# Patient Record
Sex: MALE | Race: WHITE | NOT HISPANIC OR LATINO | Employment: FULL TIME | URBAN - METROPOLITAN AREA
[De-identification: names, ages, dates, MRNs, and addresses within clinical notes are randomized per-mention and may not be internally consistent; named-entity substitution may affect disease eponyms.]

---

## 2017-10-17 ENCOUNTER — ALLSCRIPTS OFFICE VISIT (OUTPATIENT)
Dept: OTHER | Facility: OTHER | Age: 62
End: 2017-10-17

## 2017-10-17 LAB
BILIRUB UR QL STRIP: NORMAL
CLARITY UR: NORMAL
COLOR UR: YELLOW
GLUCOSE (HISTORICAL): NORMAL
HGB UR QL STRIP.AUTO: NORMAL
KETONES UR STRIP-MCNC: NORMAL MG/DL
LEUKOCYTE ESTERASE UR QL STRIP: NORMAL
NITRITE UR QL STRIP: NORMAL
PH UR STRIP.AUTO: 7 [PH]
PROT UR STRIP-MCNC: NORMAL MG/DL
SP GR UR STRIP.AUTO: 1.02
UROBILINOGEN UR QL STRIP.AUTO: 0.2

## 2018-01-14 VITALS
SYSTOLIC BLOOD PRESSURE: 108 MMHG | BODY MASS INDEX: 25.62 KG/M2 | HEIGHT: 69 IN | WEIGHT: 173 LBS | DIASTOLIC BLOOD PRESSURE: 72 MMHG

## 2018-10-12 RX ORDER — MULTIVIT-MIN/IRON/FOLIC ACID/K 18-600-40
CAPSULE ORAL DAILY
COMMUNITY

## 2018-10-12 RX ORDER — MULTIVIT WITH MINERALS/LUTEIN
TABLET ORAL DAILY
COMMUNITY

## 2018-10-12 RX ORDER — TRAMADOL HYDROCHLORIDE 50 MG/1
TABLET ORAL DAILY
COMMUNITY

## 2018-10-12 RX ORDER — SILDENAFIL CITRATE 20 MG/1
2-3 TABLET ORAL AS NEEDED
COMMUNITY
Start: 2017-10-17 | End: 2019-11-22 | Stop reason: SDUPTHER

## 2018-10-17 DIAGNOSIS — N40.1 ENLARGED PROSTATE WITH LOWER URINARY TRACT SYMPTOMS (LUTS): ICD-10-CM

## 2018-10-22 ENCOUNTER — OFFICE VISIT (OUTPATIENT)
Dept: UROLOGY | Facility: MEDICAL CENTER | Age: 63
End: 2018-10-22
Payer: COMMERCIAL

## 2018-10-22 VITALS
DIASTOLIC BLOOD PRESSURE: 90 MMHG | BODY MASS INDEX: 24.88 KG/M2 | HEIGHT: 69 IN | SYSTOLIC BLOOD PRESSURE: 142 MMHG | WEIGHT: 168 LBS

## 2018-10-22 DIAGNOSIS — N13.8 BPH WITH OBSTRUCTION/LOWER URINARY TRACT SYMPTOMS: Primary | ICD-10-CM

## 2018-10-22 DIAGNOSIS — N40.1 BPH WITH OBSTRUCTION/LOWER URINARY TRACT SYMPTOMS: Primary | ICD-10-CM

## 2018-10-22 DIAGNOSIS — N52.8 OTHER MALE ERECTILE DYSFUNCTION: ICD-10-CM

## 2018-10-22 LAB
SL AMB  POCT GLUCOSE, UA: NORMAL
SL AMB LEUKOCYTE ESTERASE,UA: NORMAL
SL AMB POCT BILIRUBIN,UA: NORMAL
SL AMB POCT BLOOD,UA: NORMAL
SL AMB POCT CLARITY,UA: CLEAR
SL AMB POCT COLOR,UA: YELLOW
SL AMB POCT KETONES,UA: NORMAL
SL AMB POCT NITRITE,UA: NORMAL
SL AMB POCT PH,UA: 5.5
SL AMB POCT SPECIFIC GRAVITY,UA: 1.02
SL AMB POCT URINE PROTEIN: NORMAL
SL AMB POCT UROBILINOGEN: 0.2

## 2018-10-22 PROCEDURE — 99214 OFFICE O/P EST MOD 30 MIN: CPT | Performed by: UROLOGY

## 2018-10-22 PROCEDURE — 81003 URINALYSIS AUTO W/O SCOPE: CPT | Performed by: UROLOGY

## 2018-10-22 NOTE — ASSESSMENT & PLAN NOTE
He is doing well with sildenafil 20 mg  He uses 3 pills with good effect  He is satisfied with this method of treatment will call when he needs a refill on his prescription

## 2018-10-22 NOTE — LETTER
October 22, 2018     Evans DixonDO  [address] [facility] [address]    Patient: Caroline Sanderson   YOB: 1955   Date of Visit: 10/22/2018       Dear Dr Eva Marion: Thank you for referring Tia Ponce to me for evaluation  Below are my notes for this consultation  If you have questions, please do not hesitate to call me  I look forward to following your patient along with you  Sincerely,        Climmie Lombard, MD        CC: No Recipients  Climmie Lombard, MD  10/22/2018  9:52 AM  Sign at close encounter  Assessment/Plan:    BPH with obstruction/lower urinary tract symptoms  He is voiding well with AUA symptom score of 2  Urinalysis is negative  PSA was 1 4 on October 11, 2018  We will continue to follow voiding pattern watchful waiting  Other male erectile dysfunction  He is doing well with sildenafil 20 mg  He uses 3 pills with good effect  He is satisfied with this method of treatment will call when he needs a refill on his prescription  Diagnoses and all orders for this visit:    BPH with obstruction/lower urinary tract symptoms  -     POCT urine dip auto non-scope  -     PSA Total, Diagnostic; Future    Other male erectile dysfunction          Subjective:      Patient ID: Caroline Sanderson is a 59 y o  male  Benign Prostatic Hypertrophy   This is a chronic problem  The current episode started more than 1 year ago  The problem is unchanged  Irritative symptoms do not include frequency, nocturia or urgency  Obstructive symptoms include an intermittent stream  Obstructive symptoms do not include incomplete emptying, a slower stream, straining or a weak stream  Pertinent negatives include no chills, dysuria, genital pain, hematuria, hesitancy, nausea or vomiting  AUA score is 0-7  He is sexually active  Nothing aggravates the symptoms  Past treatments include nothing  Erectile Dysfunction   This is a chronic problem   The current episode started more than 1 year ago  The nature of his difficulty is maintaining erection  Irritative symptoms do not include frequency, nocturia or urgency  Obstructive symptoms include an intermittent stream  Obstructive symptoms do not include incomplete emptying, a slower stream, straining or a weak stream  Pertinent negatives include no chills, dysuria, genital pain, hematuria or hesitancy  Nothing aggravates the symptoms  Past treatments include sildenafil  The treatment provided significant relief  He has been using treatment for 1 to 2 years  Risk factors include BPH  The following portions of the patient's history were reviewed and updated as appropriate: allergies, current medications, past family history, past medical history, past social history, past surgical history and problem list     Review of Systems   Constitutional: Negative for chills, diaphoresis, fatigue and fever  HENT: Negative  Eyes: Negative  Respiratory: Negative  Cardiovascular: Negative  Gastrointestinal: Negative for nausea and vomiting  Endocrine: Negative  Genitourinary: Negative for dysuria, frequency, hematuria, hesitancy, incomplete emptying, nocturia and urgency  Musculoskeletal: Positive for back pain  Skin: Negative  Allergic/Immunologic: Negative  Neurological: Negative  Hematological: Negative  Psychiatric/Behavioral: Negative  Objective:      /90 (BP Location: Left arm, Patient Position: Sitting)   Ht 5' 9" (1 753 m)   Wt 76 2 kg (168 lb)   BMI 24 81 kg/m²           Physical Exam   Constitutional: He is oriented to person, place, and time  He appears well-developed and well-nourished  HENT:   Head: Normocephalic and atraumatic  Eyes: Conjunctivae are normal    Neck: Neck supple  Cardiovascular: Normal rate  Pulmonary/Chest: Effort normal    Abdominal: Soft  Bowel sounds are normal  He exhibits no distension and no mass  There is no tenderness   There is no rebound, no guarding and no CVA tenderness  Genitourinary: Rectum normal, testes normal and penis normal  Right testis shows no mass  Left testis shows no mass  No phimosis or hypospadias  Genitourinary Comments: Prostate 1 1/2 X and palpably benign  Musculoskeletal: He exhibits no edema  Neurological: He is alert and oriented to person, place, and time  Skin: Skin is warm and dry  Psychiatric: He has a normal mood and affect  His behavior is normal  Judgment and thought content normal    Vitals reviewed

## 2018-10-22 NOTE — ASSESSMENT & PLAN NOTE
He is voiding well with AUA symptom score of 2  Urinalysis is negative  PSA was 1 4 on October 11, 2018  We will continue to follow voiding pattern watchful waiting

## 2018-10-22 NOTE — PROGRESS NOTES
Assessment/Plan:    BPH with obstruction/lower urinary tract symptoms  He is voiding well with AUA symptom score of 2  Urinalysis is negative  PSA was 1 4 on October 11, 2018  We will continue to follow voiding pattern watchful waiting  Other male erectile dysfunction  He is doing well with sildenafil 20 mg  He uses 3 pills with good effect  He is satisfied with this method of treatment will call when he needs a refill on his prescription  Diagnoses and all orders for this visit:    BPH with obstruction/lower urinary tract symptoms  -     POCT urine dip auto non-scope  -     PSA Total, Diagnostic; Future    Other male erectile dysfunction          Subjective:      Patient ID: Kerrie Meraz is a 59 y o  male  Benign Prostatic Hypertrophy   This is a chronic problem  The current episode started more than 1 year ago  The problem is unchanged  Irritative symptoms do not include frequency, nocturia or urgency  Obstructive symptoms include an intermittent stream  Obstructive symptoms do not include incomplete emptying, a slower stream, straining or a weak stream  Pertinent negatives include no chills, dysuria, genital pain, hematuria, hesitancy, nausea or vomiting  AUA score is 0-7  He is sexually active  Nothing aggravates the symptoms  Past treatments include nothing  Erectile Dysfunction   This is a chronic problem  The current episode started more than 1 year ago  The nature of his difficulty is maintaining erection  Irritative symptoms do not include frequency, nocturia or urgency  Obstructive symptoms include an intermittent stream  Obstructive symptoms do not include incomplete emptying, a slower stream, straining or a weak stream  Pertinent negatives include no chills, dysuria, genital pain, hematuria or hesitancy  Nothing aggravates the symptoms  Past treatments include sildenafil  The treatment provided significant relief  He has been using treatment for 1 to 2 years   Risk factors include BPH        The following portions of the patient's history were reviewed and updated as appropriate: allergies, current medications, past family history, past medical history, past social history, past surgical history and problem list     Review of Systems   Constitutional: Negative for chills, diaphoresis, fatigue and fever  HENT: Negative  Eyes: Negative  Respiratory: Negative  Cardiovascular: Negative  Gastrointestinal: Negative for nausea and vomiting  Endocrine: Negative  Genitourinary: Negative for dysuria, frequency, hematuria, hesitancy, incomplete emptying, nocturia and urgency  Musculoskeletal: Positive for back pain  Skin: Negative  Allergic/Immunologic: Negative  Neurological: Negative  Hematological: Negative  Psychiatric/Behavioral: Negative  Objective:      /90 (BP Location: Left arm, Patient Position: Sitting)   Ht 5' 9" (1 753 m)   Wt 76 2 kg (168 lb)   BMI 24 81 kg/m²          Physical Exam   Constitutional: He is oriented to person, place, and time  He appears well-developed and well-nourished  HENT:   Head: Normocephalic and atraumatic  Eyes: Conjunctivae are normal    Neck: Neck supple  Cardiovascular: Normal rate  Pulmonary/Chest: Effort normal    Abdominal: Soft  Bowel sounds are normal  He exhibits no distension and no mass  There is no tenderness  There is no rebound, no guarding and no CVA tenderness  Genitourinary: Rectum normal, testes normal and penis normal  Right testis shows no mass  Left testis shows no mass  No phimosis or hypospadias  Genitourinary Comments: Prostate 1 1/2 X and palpably benign  Musculoskeletal: He exhibits no edema  Neurological: He is alert and oriented to person, place, and time  Skin: Skin is warm and dry  Psychiatric: He has a normal mood and affect  His behavior is normal  Judgment and thought content normal    Vitals reviewed

## 2018-10-22 NOTE — PATIENT INSTRUCTIONS
Benign Prostatic Hypertrophy   WHAT YOU NEED TO KNOW:   Benign prostatic hypertrophy (BPH) is a condition that causes your prostate gland to grow larger than normal  The prostate gland is the male sex gland that produces a fluid that is part of semen  It is about the size of a walnut and it is located under the bladder  As the prostate grows, it can squeeze the urethra  This can block urine flow and cause urinary problems  DISCHARGE INSTRUCTIONS:   Medicines:   · Alpha blockers: This medicine relaxes the muscles in your prostate and bladder  It may help you urinate more easily  · 5 alpha reductase inhibitors: These medicines block the production of a hormone that causes the prostate to get larger  It may help slow the growth of the prostate or shrink the prostate  · Take your medicine as directed  Contact your healthcare provider if you think your medicine is not helping or if you have side effects  Tell him or her if you are allergic to any medicine  Keep a list of the medicines, vitamins, and herbs you take  Include the amounts, and when and why you take them  Bring the list or the pill bottles to follow-up visits  Carry your medicine list with you in case of an emergency  Follow up with your healthcare provider as directed:  Write down your questions so you remember to ask them during your visits  Manage BPH:   · Do not let your bladder get too full before you empty it  Urinate when you feel the urge  · Limit alcohol  Do not drink large amounts of any liquid at one time  · Decrease the amount of salt you eat  Examples of salty foods are chips, cured meats, and canned soups  Do not use table salt  · Healthcare providers may tell you not to eat spicy foods such as chilli peppers  This may help you find out if spicy food makes your BPH symptoms worse  · You may have sex if you feel well  Contact your healthcare provider if:   · There is a large amount of blood in your urine  · Your signs and symptoms get worse  · You have a fever  · You have questions or concerns about your condition or care  Seek care immediately if:   · You are unable to urinate  · Your bladder feels very full and painful  © 2017 [employer] Information is for End User's use only and may not be sold, redistributed or otherwise used for commercial purposes  All illustrations and images included in CareNotes® are the copyrighted property of [employer]  or [employer]  The above information is an  only  It is not intended as medical advice for individual conditions or treatments  Talk to your doctor, nurse or pharmacist before following any medical regimen to see if it is safe and effective for you

## 2019-09-30 ENCOUNTER — TELEPHONE (OUTPATIENT)
Dept: UROLOGY | Facility: AMBULATORY SURGERY CENTER | Age: 64
End: 2019-09-30

## 2019-09-30 DIAGNOSIS — N13.8 BPH WITH OBSTRUCTION/LOWER URINARY TRACT SYMPTOMS: Primary | ICD-10-CM

## 2019-09-30 DIAGNOSIS — N40.1 BPH WITH OBSTRUCTION/LOWER URINARY TRACT SYMPTOMS: Primary | ICD-10-CM

## 2019-09-30 NOTE — TELEPHONE ENCOUNTER
Patient managed by Rhonda Rogers r/s his appointment for 11/22/19  Needs updated psa lab order faxed to his office   [phone]

## 2019-10-08 ENCOUNTER — APPOINTMENT (OUTPATIENT)
Dept: URGENT CARE | Facility: CLINIC | Age: 64
End: 2019-10-08

## 2019-10-08 ENCOUNTER — TRANSCRIBE ORDERS (OUTPATIENT)
Dept: URGENT CARE | Facility: CLINIC | Age: 64
End: 2019-10-08

## 2019-10-08 DIAGNOSIS — Z23 NEED FOR INFLUENZA VACCINATION: Primary | ICD-10-CM

## 2019-10-08 DIAGNOSIS — Z23 NEEDS FLU SHOT: Primary | ICD-10-CM

## 2019-11-06 ENCOUNTER — APPOINTMENT (OUTPATIENT)
Dept: LAB | Age: 64
End: 2019-11-06

## 2019-11-06 ENCOUNTER — TRANSCRIBE ORDERS (OUTPATIENT)
Dept: URGENT CARE | Age: 64
End: 2019-11-06

## 2019-11-06 ENCOUNTER — APPOINTMENT (OUTPATIENT)
Dept: URGENT CARE | Age: 64
End: 2019-11-06

## 2019-11-06 DIAGNOSIS — Z13.9 SCREENING FOR UNSPECIFIED CONDITION: Primary | ICD-10-CM

## 2019-11-06 DIAGNOSIS — Z13.9 SCREENING FOR UNSPECIFIED CONDITION: ICD-10-CM

## 2019-11-06 LAB
CHOLEST SERPL-MCNC: 173 MG/DL (ref 50–200)
GLUCOSE P FAST SERPL-MCNC: 80 MG/DL (ref 65–99)
HDLC SERPL-MCNC: 48 MG/DL
LDLC SERPL CALC-MCNC: 111 MG/DL (ref 0–100)
NONHDLC SERPL-MCNC: 125 MG/DL
TRIGL SERPL-MCNC: 68 MG/DL

## 2019-11-06 PROCEDURE — 36415 COLL VENOUS BLD VENIPUNCTURE: CPT

## 2019-11-06 PROCEDURE — 80323 ALKALOIDS NOS: CPT

## 2019-11-06 PROCEDURE — 80061 LIPID PANEL: CPT

## 2019-11-06 PROCEDURE — 82947 ASSAY GLUCOSE BLOOD QUANT: CPT

## 2019-11-12 LAB
COTININE SERPL-MCNC: NORMAL NG/ML
NICOTINE SERPL-MCNC: NORMAL NG/ML

## 2019-11-22 ENCOUNTER — OFFICE VISIT (OUTPATIENT)
Dept: UROLOGY | Facility: MEDICAL CENTER | Age: 64
End: 2019-11-22
Payer: COMMERCIAL

## 2019-11-22 VITALS
HEIGHT: 69 IN | SYSTOLIC BLOOD PRESSURE: 142 MMHG | WEIGHT: 168 LBS | DIASTOLIC BLOOD PRESSURE: 80 MMHG | BODY MASS INDEX: 24.88 KG/M2

## 2019-11-22 DIAGNOSIS — N13.8 BPH WITH OBSTRUCTION/LOWER URINARY TRACT SYMPTOMS: Primary | ICD-10-CM

## 2019-11-22 DIAGNOSIS — N52.8 OTHER MALE ERECTILE DYSFUNCTION: ICD-10-CM

## 2019-11-22 DIAGNOSIS — N40.1 BPH WITH OBSTRUCTION/LOWER URINARY TRACT SYMPTOMS: Primary | ICD-10-CM

## 2019-11-22 LAB
SL AMB  POCT GLUCOSE, UA: NORMAL
SL AMB LEUKOCYTE ESTERASE,UA: NORMAL
SL AMB POCT BILIRUBIN,UA: NORMAL
SL AMB POCT BLOOD,UA: NORMAL
SL AMB POCT CLARITY,UA: CLEAR
SL AMB POCT COLOR,UA: YELLOW
SL AMB POCT KETONES,UA: NORMAL
SL AMB POCT NITRITE,UA: NORMAL
SL AMB POCT PH,UA: 6.5
SL AMB POCT SPECIFIC GRAVITY,UA: 1.02
SL AMB POCT URINE PROTEIN: NORMAL
SL AMB POCT UROBILINOGEN: 0.2

## 2019-11-22 PROCEDURE — 81003 URINALYSIS AUTO W/O SCOPE: CPT | Performed by: UROLOGY

## 2019-11-22 PROCEDURE — 99214 OFFICE O/P EST MOD 30 MIN: CPT | Performed by: UROLOGY

## 2019-11-22 RX ORDER — SILDENAFIL CITRATE 20 MG/1
60 TABLET ORAL AS NEEDED
Qty: 90 TABLET | Refills: 1 | Status: SHIPPED | OUTPATIENT
Start: 2019-11-22

## 2019-11-22 RX ORDER — BIOTIN 10000 MCG
CAPSULE ORAL
COMMUNITY

## 2019-11-22 RX ORDER — PNV NO.95/FERROUS FUM/FOLIC AC 28MG-0.8MG
2 TABLET ORAL DAILY
COMMUNITY

## 2019-11-22 NOTE — ASSESSMENT & PLAN NOTE
AUA symptom score is 2  PSA is normal at 1 5 (October 21, 2019)  He is pleased with his voiding pattern  Urinalysis is negative  We will continue to follow his voiding pattern with watchful waiting  He will return in 1 year and we will plan to recheck a PSA prior to that visit

## 2019-11-22 NOTE — PROGRESS NOTES
Assessment/Plan:    BPH with obstruction/lower urinary tract symptoms  AUA symptom score is 2  PSA is normal at 1 5 (October 21, 2019)  He is pleased with his voiding pattern  Urinalysis is negative  We will continue to follow his voiding pattern with watchful waiting  He will return in 1 year and we will plan to recheck a PSA prior to that visit  Other male erectile dysfunction  He is doing well with sildenafil 20 mg  He continues to use 3 pills with good effect  He is satisfied with this method of treatment  His prescription was refilled  Diagnoses and all orders for this visit:    BPH with obstruction/lower urinary tract symptoms  -     POCT urine dip auto non-scope  -     PSA Total, Diagnostic; Future    Other male erectile dysfunction  -     sildenafil (REVATIO) 20 mg tablet; Take 3 tablets (60 mg total) by mouth as needed (ED) 1 hour prior to sexual activity  Other orders  -     Omega-3 Fatty Acids (FISH OIL OMEGA-3) 1000 MG CAPS; Take 2 g by mouth daily  -     Biotin 10 MG CAPS; Take by mouth          Subjective:      Patient ID: Agustin Wiley is a 62 y o  male  Benign Prostatic Hypertrophy   This is a chronic problem  The current episode started more than 1 year ago  The problem is unchanged  Irritative symptoms do not include frequency, nocturia or urgency  Obstructive symptoms do not include dribbling, incomplete emptying, an intermittent stream, a slower stream or straining  Pertinent negatives include no chills, dysuria, genital pain, hematuria or hesitancy  AUA score is 0-7  He is sexually active  Nothing aggravates the symptoms  Past treatments include nothing  Erectile Dysfunction   This is a chronic problem  The current episode started more than 1 year ago  The problem is unchanged  The nature of his difficulty is achieving erection  He reports no anxiety, decreased libido or performance anxiety  Irritative symptoms do not include frequency, nocturia or urgency   Obstructive symptoms do not include dribbling, incomplete emptying, an intermittent stream, a slower stream or straining  Pertinent negatives include no chills, dysuria, genital pain, hematuria or hesitancy  Nothing aggravates the symptoms  Past treatments include sildenafil  The treatment provided moderate relief  He has been using treatment for 1 to 2 years  He has had headaches caused by medications  Risk factors include BPH  The following portions of the patient's history were reviewed and updated as appropriate: allergies, current medications, past family history, past medical history, past social history, past surgical history and problem list     Review of Systems   Constitutional: Negative  Negative for chills, diaphoresis, fatigue and fever  HENT: Negative  Eyes: Negative  Respiratory: Negative  Cardiovascular: Negative  Gastrointestinal: Negative  Endocrine: Negative  Genitourinary: Negative for decreased libido, dysuria, frequency, hematuria, hesitancy, incomplete emptying, nocturia and urgency  See HPI   Musculoskeletal: Negative  Skin: Negative  Allergic/Immunologic: Negative  Neurological: Negative  Hematological: Negative  Psychiatric/Behavioral: Negative  The patient is not nervous/anxious  AUA SYMPTOM SCORE      Most Recent Value   AUA SYMPTOM SCORE   How often have you had a sensation of not emptying your bladder completely after you finished urinating? 0   How often have you had to urinate again less than two hours after you finished urinating? 0   How often have you found you stopped and started again several times when you urinate? 1   How often have you found it difficult to postpone urination? 0   How often have you had a weak urinary stream?  1   How often have you had to push or strain to begin urination?   0   How many times did you most typically get up to urinate from the time you went to bed at night until the time you got up in the morning?  0   Quality of Life: If you were to spend the rest of your life with your urinary condition just the way it is now, how would you feel about that?  0   AUA SYMPTOM SCORE  2        Objective:      /80 (BP Location: Left arm, Patient Position: Sitting, Cuff Size: Adult)   Ht 5' 9" (1 753 m)   Wt 76 2 kg (168 lb)   BMI 24 81 kg/m²          Physical Exam   Constitutional: He is oriented to person, place, and time  He appears well-developed and well-nourished  HENT:   Head: Normocephalic and atraumatic  Eyes: Conjunctivae are normal    Neck: Neck supple  Cardiovascular: Normal rate  Pulmonary/Chest: Effort normal    Abdominal: Soft  Bowel sounds are normal  He exhibits no distension and no mass  There is no tenderness  There is no rebound, no guarding and no CVA tenderness  No hernia  Genitourinary: Rectum normal, testes normal and penis normal  Right testis shows no mass  Left testis shows no mass  No phimosis or hypospadias  Genitourinary Comments: Prostate 1 5 X enlarged and palpably benign  The prostate is smooth, symmetric and free of any induration  Musculoskeletal: He exhibits no edema  Neurological: He is alert and oriented to person, place, and time  Skin: Skin is warm and dry  Psychiatric: He has a normal mood and affect  His behavior is normal  Judgment and thought content normal    Vitals reviewed

## 2019-11-22 NOTE — PATIENT INSTRUCTIONS
Sildenafil (By mouth)   Sildenafil ([id number])  Treats erectile dysfunction  Also treats pulmonary arterial hypertension (high blood pressure in the lungs)  Brand Name(s): Revatio Viagra   There may be other brand names for this medicine  When This Medicine Should Not Be Used: This medicine is not right for everyone  Do not use it if you had an allergic reaction to sildenafil  How to Use This Medicine:   Tablet, Liquid  · Your doctor will tell you how much medicine to use  Do not use more than directed  · For erectile dysfunction: Take this medicine about 1 hour before you have sex  Do not take it more than once a day  Always allow at least 24 hours between doses  · For pulmonary arterial hypertension:   ¨ Take this medicine 3 times a day, 4 to 6 hours apart  ¨ If you miss a dose, take it as soon as you remember  If it is almost time for your next dose, wait until then and take a regular dose  Do not take extra medicine to make up for a missed dose  · Oral liquid: Shake the bottle well for at least 10 seconds  Use the oral syringe provided in the package to measure each dose  Wash the syringe after each use  · Read and follow the patient instructions that come with this medicine  Talk to your doctor or pharmacist if you have any questions  · Store the medicine in a closed container at room temperature, away from heat, moisture, and direct light  · Throw away any unused mixed oral liquid after 60 days  Drugs and Foods to Avoid:   Ask your doctor or pharmacist before using any other medicine, including over-the-counter medicines, vitamins, and herbal products  · Do not use this medicine if you also use riociguat or a nitrate medicine  Do not take other medicines that contain sildenafil or similar medicines, such as tadalafil or vardenafil  · Some medicines can affect how sildenafil works   Tell your doctor if you are using any of the following:   ¨ Amlodipine, atazanavir, bosentan, cimetidine, erythromycin, indinavir, itraconazole, ketoconazole, rifampin, ritonavir, saquinavir  ¨ Medicine for prostate problems or high blood pressure (including alfuzosin, doxazosin, prazosin, silodosin, tamsulosin, terazosin)  Warnings While Using This Medicine:   · Tell your doctor if you are pregnant or breastfeeding, or if you have kidney disease, liver disease, pulmonary veno-occlusive disease, diabetes, bleeding problems, leukemia, multiple myeloma, sickle cell anemia, a stomach ulcer, or eye problems  Tell your doctor if you have angina or chest pain during sex, heart disease, heart rhythm problems, high or low blood pressure, or a history of heart attack or stroke  Also tell your doctor if you smoke  · Tell any doctor who treats you that you take sildenafil  · This medicine may cause the following problems:   ¨ Low blood pressure (especially if taken with other medicines that lower blood pressure)  ¨ Heart problems  ¨ Painful or prolonged erection  ¨ Vision or hearing problems  · Keep all medicine out of the reach of children  Never share your medicine with anyone    Possible Side Effects While Using This Medicine:   Call your doctor right away if you notice any of these side effects:  · Allergic reaction: Itching or hives, swelling in your face or hands, swelling or tingling in your mouth or throat, chest tightness, trouble breathing  · Chest pain, trouble breathing, sudden or severe headache  · Fast, slow, pounding, or uneven heartbeat  · Lightheadedness, fainting  · Painful erection or an erection that lasts longer than 4 hours  · Sudden loss of vision  · Sudden decrease in hearing or hearing loss, ringing in the ears, dizziness  If you notice these less serious side effects, talk with your doctor:   · Headache  · Nosebleeds  · Stuffy or runny nose  · Upset stomach  · Warmth or redness in your face, neck, arms, or upper chest  If you notice other side effects that you think are caused by this medicine, tell your doctor  Call your doctor for medical advice about side effects  You may report side effects to FDA at 3-213-FDA-9053  © 2017 [employer] Information is for End User's use only and may not be sold, redistributed or otherwise used for commercial purposes  The above information is an  only  It is not intended as medical advice for individual conditions or treatments  Talk to your doctor, nurse or pharmacist before following any medical regimen to see if it is safe and effective for you

## 2019-11-22 NOTE — LETTER
November 22, 2019     Evens ReevesDO  [address] [facility] [address]    Patient: La Nena Miller   YOB: 1955   Date of Visit: 11/22/2019       Dear Dr Nithin Roberto: Thank you for referring Chung Martins to me for evaluation  Below are my notes for this consultation  If you have questions, please do not hesitate to call me  I look forward to following your patient along with you  Sincerely,        Eli Rausch MD        CC: No Recipients  Eli Rausch MD  11/22/2019  5:58 PM  Incomplete  Assessment/Plan:    BPH with obstruction/lower urinary tract symptoms  AUA symptom score is 2  PSA is normal at 1 5 (October 21, 2019)  He is pleased with his voiding pattern  Urinalysis is negative  We will continue to follow his voiding pattern with watchful waiting  He will return in 1 year and we will plan to recheck a PSA prior to that visit  Other male erectile dysfunction  He is doing well with sildenafil 20 mg  He continues to use 3 pills with good effect  He is satisfied with this method of treatment  His prescription was refilled  Diagnoses and all orders for this visit:    BPH with obstruction/lower urinary tract symptoms  -     POCT urine dip auto non-scope  -     PSA Total, Diagnostic; Future    Other male erectile dysfunction  -     sildenafil (REVATIO) 20 mg tablet; Take 3 tablets (60 mg total) by mouth as needed (ED) 1 hour prior to sexual activity  Other orders  -     Omega-3 Fatty Acids (FISH OIL OMEGA-3) 1000 MG CAPS; Take 2 g by mouth daily  -     Biotin 10 MG CAPS; Take by mouth          Subjective:      Patient ID: La Nena Miller is a 62 y o  male  Benign Prostatic Hypertrophy   This is a chronic problem  The current episode started more than 1 year ago  The problem is unchanged  Irritative symptoms do not include frequency, nocturia or urgency   Obstructive symptoms do not include dribbling, incomplete emptying, an intermittent stream, a slower stream or straining  Pertinent negatives include no chills, dysuria, genital pain, hematuria or hesitancy  AUA score is 0-7  He is sexually active  Nothing aggravates the symptoms  Past treatments include nothing  Erectile Dysfunction   This is a chronic problem  The current episode started more than 1 year ago  The problem is unchanged  The nature of his difficulty is achieving erection  He reports no anxiety, decreased libido or performance anxiety  Irritative symptoms do not include frequency, nocturia or urgency  Obstructive symptoms do not include dribbling, incomplete emptying, an intermittent stream, a slower stream or straining  Pertinent negatives include no chills, dysuria, genital pain, hematuria or hesitancy  Nothing aggravates the symptoms  Past treatments include sildenafil  The treatment provided moderate relief  He has been using treatment for 1 to 2 years  He has had headaches caused by medications  Risk factors include BPH  The following portions of the patient's history were reviewed and updated as appropriate: allergies, current medications, past family history, past medical history, past social history, past surgical history and problem list     Review of Systems   Constitutional: Negative  Negative for chills, diaphoresis, fatigue and fever  HENT: Negative  Eyes: Negative  Respiratory: Negative  Cardiovascular: Negative  Gastrointestinal: Negative  Endocrine: Negative  Genitourinary: Negative for decreased libido, dysuria, frequency, hematuria, hesitancy, incomplete emptying, nocturia and urgency  See HPI   Musculoskeletal: Negative  Skin: Negative  Allergic/Immunologic: Negative  Neurological: Negative  Hematological: Negative  Psychiatric/Behavioral: Negative  The patient is not nervous/anxious          AUA SYMPTOM SCORE      Most Recent Value   AUA SYMPTOM SCORE   How often have you had a sensation of not emptying your bladder completely after you finished urinating? 0   How often have you had to urinate again less than two hours after you finished urinating? 0   How often have you found you stopped and started again several times when you urinate? 1   How often have you found it difficult to postpone urination? 0   How often have you had a weak urinary stream?  1   How often have you had to push or strain to begin urination? 0   How many times did you most typically get up to urinate from the time you went to bed at night until the time you got up in the morning?  0   Quality of Life: If you were to spend the rest of your life with your urinary condition just the way it is now, how would you feel about that?  0   AUA SYMPTOM SCORE  2        Objective:      /80 (BP Location: Left arm, Patient Position: Sitting, Cuff Size: Adult)   Ht 5' 9" (1 753 m)   Wt 76 2 kg (168 lb)   BMI 24 81 kg/m²           Physical Exam   Constitutional: He is oriented to person, place, and time  He appears well-developed and well-nourished  HENT:   Head: Normocephalic and atraumatic  Eyes: Conjunctivae are normal    Neck: Neck supple  Cardiovascular: Normal rate  Pulmonary/Chest: Effort normal    Abdominal: Soft  Bowel sounds are normal  He exhibits no distension and no mass  There is no tenderness  There is no rebound, no guarding and no CVA tenderness  No hernia  Genitourinary: Rectum normal, testes normal and penis normal  Right testis shows no mass  Left testis shows no mass  No phimosis or hypospadias  Genitourinary Comments: Prostate 1 5 X enlarged and palpably benign  The prostate is smooth, symmetric and free of any induration  Musculoskeletal: He exhibits no edema  Neurological: He is alert and oriented to person, place, and time  Skin: Skin is warm and dry  Psychiatric: He has a normal mood and affect  His behavior is normal  Judgment and thought content normal    Vitals reviewed          Joon Wang MD 11/22/2019  5:06 PM  Incomplete  Assessment/Plan:    No problem-specific Assessment & Plan notes found for this encounter  Diagnoses and all orders for this visit:    BPH with obstruction/lower urinary tract symptoms  -     POCT urine dip auto non-scope  -     PSA Total, Diagnostic; Future    Other male erectile dysfunction  -     sildenafil (REVATIO) 20 mg tablet; Take 3 tablets (60 mg total) by mouth as needed (ED) 1 hour prior to sexual activity  Other orders  -     Omega-3 Fatty Acids (FISH OIL OMEGA-3) 1000 MG CAPS; Take 2 g by mouth daily  -     Biotin 10 MG CAPS; Take by mouth          Subjective:      Patient ID: Raul Olivera is a 62 y o  male  HPI    The following portions of the patient's history were reviewed and updated as appropriate: allergies, current medications, past family history, past medical history, past social history, past surgical history and problem list     Review of Systems   Constitutional: Negative  Negative for chills, diaphoresis, fatigue and fever  HENT: Negative  Eyes: Negative  Respiratory: Negative  Cardiovascular: Negative  Gastrointestinal: Negative  Endocrine: Negative  Genitourinary:        See HPI   Musculoskeletal: Negative  Skin: Negative  Allergic/Immunologic: Negative  Neurological: Negative  Hematological: Negative  Psychiatric/Behavioral: Negative  AUA SYMPTOM SCORE      Most Recent Value   AUA SYMPTOM SCORE   How often have you had a sensation of not emptying your bladder completely after you finished urinating? 0   How often have you had to urinate again less than two hours after you finished urinating? 0   How often have you found you stopped and started again several times when you urinate? 1   How often have you found it difficult to postpone urination? 0   How often have you had a weak urinary stream?  1   How often have you had to push or strain to begin urination?   0   How many times did you most typically get up to urinate from the time you went to bed at night until the time you got up in the morning?  0   Quality of Life: If you were to spend the rest of your life with your urinary condition just the way it is now, how would you feel about that?  0   AUA SYMPTOM SCORE  2        Objective:      /80 (BP Location: Left arm, Patient Position: Sitting, Cuff Size: Adult)   Ht 5' 9" (1 753 m)   Wt 76 2 kg (168 lb)   BMI 24 81 kg/m²           Physical Exam   Constitutional: He is oriented to person, place, and time  He appears well-developed and well-nourished  HENT:   Head: Normocephalic and atraumatic  Eyes: Conjunctivae are normal    Neck: Neck supple  Cardiovascular: Normal rate  Pulmonary/Chest: Effort normal    Abdominal: Soft  Bowel sounds are normal  He exhibits no distension and no mass  There is no tenderness  There is no rebound, no guarding and no CVA tenderness  No hernia  Genitourinary: Rectum normal, testes normal and penis normal  Right testis shows no mass  Left testis shows no mass  No phimosis or hypospadias  Genitourinary Comments: Prostate 1 5 X enlarged and palpably benign  The prostate is smooth, symmetric and free of any induration  Musculoskeletal: He exhibits no edema  Neurological: He is alert and oriented to person, place, and time  Skin: Skin is warm and dry  Psychiatric: He has a normal mood and affect  His behavior is normal  Judgment and thought content normal    Vitals reviewed

## 2019-11-22 NOTE — ASSESSMENT & PLAN NOTE
He is doing well with sildenafil 20 mg  He continues to use 3 pills with good effect  He is satisfied with this method of treatment  His prescription was refilled

## 2020-10-15 ENCOUNTER — APPOINTMENT (OUTPATIENT)
Dept: URGENT CARE | Facility: CLINIC | Age: 65
End: 2020-10-15

## 2020-10-15 DIAGNOSIS — Z23 NEED FOR IMMUNIZATION AGAINST INFLUENZA: Primary | ICD-10-CM

## 2020-10-26 ENCOUNTER — EVALUATION (OUTPATIENT)
Dept: PHYSICAL THERAPY | Facility: MEDICAL CENTER | Age: 65
End: 2020-10-26
Payer: OTHER MISCELLANEOUS

## 2020-10-26 DIAGNOSIS — S13.4XXA NECK PAIN WITH NECK STIFFNESS AFTER WHIPLASH INJURY TO NECK: Primary | ICD-10-CM

## 2020-10-26 PROCEDURE — 97161 PT EVAL LOW COMPLEX 20 MIN: CPT | Performed by: PHYSICAL THERAPIST

## 2020-10-27 ENCOUNTER — TRANSCRIBE ORDERS (OUTPATIENT)
Dept: PHYSICAL THERAPY | Facility: MEDICAL CENTER | Age: 65
End: 2020-10-27

## 2020-10-27 DIAGNOSIS — S13.4XXA NECK PAIN WITH NECK STIFFNESS AFTER WHIPLASH INJURY TO NECK: Primary | ICD-10-CM

## 2020-11-04 ENCOUNTER — OFFICE VISIT (OUTPATIENT)
Dept: PHYSICAL THERAPY | Facility: MEDICAL CENTER | Age: 65
End: 2020-11-04
Payer: OTHER MISCELLANEOUS

## 2020-11-04 DIAGNOSIS — S13.4XXA NECK PAIN WITH NECK STIFFNESS AFTER WHIPLASH INJURY TO NECK: Primary | ICD-10-CM

## 2020-11-04 PROCEDURE — 97112 NEUROMUSCULAR REEDUCATION: CPT | Performed by: PHYSICAL THERAPIST

## 2020-11-04 PROCEDURE — 97140 MANUAL THERAPY 1/> REGIONS: CPT | Performed by: PHYSICAL THERAPIST

## 2020-11-04 PROCEDURE — 97110 THERAPEUTIC EXERCISES: CPT | Performed by: PHYSICAL THERAPIST

## 2020-11-11 ENCOUNTER — OFFICE VISIT (OUTPATIENT)
Dept: PHYSICAL THERAPY | Facility: MEDICAL CENTER | Age: 65
End: 2020-11-11
Payer: OTHER MISCELLANEOUS

## 2020-11-11 DIAGNOSIS — S13.4XXA NECK PAIN WITH NECK STIFFNESS AFTER WHIPLASH INJURY TO NECK: Primary | ICD-10-CM

## 2020-11-11 PROCEDURE — 97140 MANUAL THERAPY 1/> REGIONS: CPT | Performed by: PHYSICAL THERAPIST

## 2020-11-11 PROCEDURE — 97112 NEUROMUSCULAR REEDUCATION: CPT | Performed by: PHYSICAL THERAPIST

## 2020-11-11 PROCEDURE — 97110 THERAPEUTIC EXERCISES: CPT | Performed by: PHYSICAL THERAPIST

## 2020-11-18 ENCOUNTER — OFFICE VISIT (OUTPATIENT)
Dept: PHYSICAL THERAPY | Facility: MEDICAL CENTER | Age: 65
End: 2020-11-18
Payer: OTHER MISCELLANEOUS

## 2020-11-18 DIAGNOSIS — S13.4XXA NECK PAIN WITH NECK STIFFNESS AFTER WHIPLASH INJURY TO NECK: Primary | ICD-10-CM

## 2020-11-18 PROCEDURE — 97140 MANUAL THERAPY 1/> REGIONS: CPT | Performed by: PHYSICAL THERAPIST

## 2020-11-18 PROCEDURE — 97112 NEUROMUSCULAR REEDUCATION: CPT | Performed by: PHYSICAL THERAPIST

## 2020-11-18 PROCEDURE — 97110 THERAPEUTIC EXERCISES: CPT | Performed by: PHYSICAL THERAPIST

## 2020-11-24 ENCOUNTER — OFFICE VISIT (OUTPATIENT)
Dept: PHYSICAL THERAPY | Facility: MEDICAL CENTER | Age: 65
End: 2020-11-24
Payer: OTHER MISCELLANEOUS

## 2020-11-24 DIAGNOSIS — S13.4XXA NECK PAIN WITH NECK STIFFNESS AFTER WHIPLASH INJURY TO NECK: Primary | ICD-10-CM

## 2020-11-24 PROCEDURE — 97140 MANUAL THERAPY 1/> REGIONS: CPT | Performed by: PHYSICAL THERAPIST

## 2020-11-24 PROCEDURE — 97112 NEUROMUSCULAR REEDUCATION: CPT | Performed by: PHYSICAL THERAPIST

## 2020-11-24 PROCEDURE — 97110 THERAPEUTIC EXERCISES: CPT | Performed by: PHYSICAL THERAPIST

## 2020-11-27 DIAGNOSIS — N13.8 BPH WITH OBSTRUCTION/LOWER URINARY TRACT SYMPTOMS: Primary | ICD-10-CM

## 2020-11-27 DIAGNOSIS — N40.1 BPH WITH OBSTRUCTION/LOWER URINARY TRACT SYMPTOMS: Primary | ICD-10-CM

## 2020-12-02 ENCOUNTER — OFFICE VISIT (OUTPATIENT)
Dept: PHYSICAL THERAPY | Facility: MEDICAL CENTER | Age: 65
End: 2020-12-02
Payer: OTHER MISCELLANEOUS

## 2020-12-02 DIAGNOSIS — S13.4XXA NECK PAIN WITH NECK STIFFNESS AFTER WHIPLASH INJURY TO NECK: Primary | ICD-10-CM

## 2020-12-02 PROCEDURE — 97112 NEUROMUSCULAR REEDUCATION: CPT | Performed by: PHYSICAL THERAPIST

## 2020-12-02 PROCEDURE — 97140 MANUAL THERAPY 1/> REGIONS: CPT | Performed by: PHYSICAL THERAPIST

## 2020-12-02 PROCEDURE — 97110 THERAPEUTIC EXERCISES: CPT | Performed by: PHYSICAL THERAPIST

## 2020-12-09 ENCOUNTER — OFFICE VISIT (OUTPATIENT)
Dept: PHYSICAL THERAPY | Facility: MEDICAL CENTER | Age: 65
End: 2020-12-09
Payer: OTHER MISCELLANEOUS

## 2020-12-09 DIAGNOSIS — S13.4XXA NECK PAIN WITH NECK STIFFNESS AFTER WHIPLASH INJURY TO NECK: Primary | ICD-10-CM

## 2020-12-09 PROCEDURE — 97110 THERAPEUTIC EXERCISES: CPT | Performed by: PHYSICAL THERAPIST

## 2020-12-09 PROCEDURE — 97112 NEUROMUSCULAR REEDUCATION: CPT | Performed by: PHYSICAL THERAPIST

## 2020-12-09 PROCEDURE — 97140 MANUAL THERAPY 1/> REGIONS: CPT | Performed by: PHYSICAL THERAPIST

## 2020-12-16 ENCOUNTER — APPOINTMENT (OUTPATIENT)
Dept: PHYSICAL THERAPY | Facility: MEDICAL CENTER | Age: 65
End: 2020-12-16
Payer: OTHER MISCELLANEOUS

## 2020-12-18 ENCOUNTER — OFFICE VISIT (OUTPATIENT)
Dept: PHYSICAL THERAPY | Facility: MEDICAL CENTER | Age: 65
End: 2020-12-18
Payer: OTHER MISCELLANEOUS

## 2020-12-18 DIAGNOSIS — S13.4XXA NECK PAIN WITH NECK STIFFNESS AFTER WHIPLASH INJURY TO NECK: Primary | ICD-10-CM

## 2020-12-18 PROCEDURE — 97140 MANUAL THERAPY 1/> REGIONS: CPT | Performed by: PHYSICAL THERAPIST

## 2020-12-18 PROCEDURE — 97110 THERAPEUTIC EXERCISES: CPT | Performed by: PHYSICAL THERAPIST

## 2020-12-18 PROCEDURE — 97112 NEUROMUSCULAR REEDUCATION: CPT | Performed by: PHYSICAL THERAPIST

## 2020-12-23 ENCOUNTER — OFFICE VISIT (OUTPATIENT)
Dept: PHYSICAL THERAPY | Facility: MEDICAL CENTER | Age: 65
End: 2020-12-23
Payer: OTHER MISCELLANEOUS

## 2020-12-23 DIAGNOSIS — S13.4XXA NECK PAIN WITH NECK STIFFNESS AFTER WHIPLASH INJURY TO NECK: Primary | ICD-10-CM

## 2020-12-23 PROCEDURE — 97110 THERAPEUTIC EXERCISES: CPT | Performed by: PHYSICAL THERAPIST

## 2020-12-23 PROCEDURE — 97140 MANUAL THERAPY 1/> REGIONS: CPT | Performed by: PHYSICAL THERAPIST

## 2020-12-30 ENCOUNTER — OFFICE VISIT (OUTPATIENT)
Dept: PHYSICAL THERAPY | Facility: MEDICAL CENTER | Age: 65
End: 2020-12-30
Payer: OTHER MISCELLANEOUS

## 2020-12-30 DIAGNOSIS — S13.4XXA NECK PAIN WITH NECK STIFFNESS AFTER WHIPLASH INJURY TO NECK: Primary | ICD-10-CM

## 2020-12-30 PROCEDURE — 97112 NEUROMUSCULAR REEDUCATION: CPT | Performed by: PHYSICAL THERAPIST

## 2020-12-30 PROCEDURE — 97110 THERAPEUTIC EXERCISES: CPT | Performed by: PHYSICAL THERAPIST

## 2020-12-30 PROCEDURE — 97140 MANUAL THERAPY 1/> REGIONS: CPT | Performed by: PHYSICAL THERAPIST

## 2021-01-06 ENCOUNTER — OFFICE VISIT (OUTPATIENT)
Dept: PHYSICAL THERAPY | Facility: MEDICAL CENTER | Age: 66
End: 2021-01-06
Payer: OTHER MISCELLANEOUS

## 2021-01-06 DIAGNOSIS — N40.1 BPH WITH OBSTRUCTION/LOWER URINARY TRACT SYMPTOMS: Primary | ICD-10-CM

## 2021-01-06 DIAGNOSIS — N13.8 BPH WITH OBSTRUCTION/LOWER URINARY TRACT SYMPTOMS: Primary | ICD-10-CM

## 2021-01-06 DIAGNOSIS — S13.4XXA NECK PAIN WITH NECK STIFFNESS AFTER WHIPLASH INJURY TO NECK: Primary | ICD-10-CM

## 2021-01-06 PROCEDURE — 97112 NEUROMUSCULAR REEDUCATION: CPT | Performed by: PHYSICAL THERAPIST

## 2021-01-06 PROCEDURE — 97140 MANUAL THERAPY 1/> REGIONS: CPT | Performed by: PHYSICAL THERAPIST

## 2021-01-06 PROCEDURE — 97110 THERAPEUTIC EXERCISES: CPT | Performed by: PHYSICAL THERAPIST
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
## 2021-01-08 ENCOUNTER — IMMUNIZATIONS (OUTPATIENT)
Dept: FAMILY MEDICINE CLINIC | Facility: HOSPITAL | Age: 66
End: 2021-01-08

## 2021-01-08 DIAGNOSIS — Z23 ENCOUNTER FOR IMMUNIZATION: ICD-10-CM

## 2021-01-08 PROCEDURE — 91300 SARS-COV-2 / COVID-19 MRNA VACCINE (PFIZER-BIONTECH) 30 MCG: CPT

## 2021-01-08 PROCEDURE — 0001A SARS-COV-2 / COVID-19 MRNA VACCINE (PFIZER-BIONTECH) 30 MCG: CPT

## 2021-01-13 ENCOUNTER — APPOINTMENT (OUTPATIENT)
Dept: PHYSICAL THERAPY | Facility: MEDICAL CENTER | Age: 66
End: 2021-01-13
Payer: OTHER MISCELLANEOUS

## 2021-01-20 ENCOUNTER — APPOINTMENT (OUTPATIENT)
Dept: PHYSICAL THERAPY | Facility: MEDICAL CENTER | Age: 66
End: 2021-01-20
Payer: OTHER MISCELLANEOUS

## 2021-01-27 ENCOUNTER — APPOINTMENT (OUTPATIENT)
Dept: PHYSICAL THERAPY | Facility: MEDICAL CENTER | Age: 66
End: 2021-01-27
Payer: OTHER MISCELLANEOUS

## 2021-01-29 ENCOUNTER — IMMUNIZATIONS (OUTPATIENT)
Dept: FAMILY MEDICINE CLINIC | Facility: HOSPITAL | Age: 66
End: 2021-01-29

## 2021-01-29 DIAGNOSIS — Z23 ENCOUNTER FOR IMMUNIZATION: Primary | ICD-10-CM

## 2021-01-29 PROCEDURE — 91300 SARS-COV-2 / COVID-19 MRNA VACCINE (PFIZER-BIONTECH) 30 MCG: CPT

## 2021-01-29 PROCEDURE — 0002A SARS-COV-2 / COVID-19 MRNA VACCINE (PFIZER-BIONTECH) 30 MCG: CPT

## 2021-05-07 ENCOUNTER — APPOINTMENT (OUTPATIENT)
Dept: LAB | Age: 66
End: 2021-05-07
Payer: COMMERCIAL

## 2021-05-07 DIAGNOSIS — N40.1 BPH WITH OBSTRUCTION/LOWER URINARY TRACT SYMPTOMS: ICD-10-CM

## 2021-05-07 DIAGNOSIS — N13.8 BPH WITH OBSTRUCTION/LOWER URINARY TRACT SYMPTOMS: ICD-10-CM

## 2021-05-07 LAB — PSA SERPL-MCNC: 1.7 NG/ML (ref 0–4)

## 2021-05-07 PROCEDURE — 84153 ASSAY OF PSA TOTAL: CPT

## 2021-08-25 ENCOUNTER — OFFICE VISIT (OUTPATIENT)
Dept: UROLOGY | Facility: MEDICAL CENTER | Age: 66
End: 2021-08-25
Payer: COMMERCIAL

## 2021-08-25 VITALS
BODY MASS INDEX: 25.48 KG/M2 | SYSTOLIC BLOOD PRESSURE: 120 MMHG | DIASTOLIC BLOOD PRESSURE: 78 MMHG | HEIGHT: 69 IN | WEIGHT: 172 LBS

## 2021-08-25 DIAGNOSIS — N13.8 BPH WITH OBSTRUCTION/LOWER URINARY TRACT SYMPTOMS: ICD-10-CM

## 2021-08-25 DIAGNOSIS — N40.1 BPH WITH OBSTRUCTION/LOWER URINARY TRACT SYMPTOMS: ICD-10-CM

## 2021-08-25 DIAGNOSIS — Z12.5 SPECIAL SCREENING FOR MALIGNANT NEOPLASM OF PROSTATE: Primary | ICD-10-CM

## 2021-08-25 DIAGNOSIS — N52.8 OTHER MALE ERECTILE DYSFUNCTION: ICD-10-CM

## 2021-08-25 PROCEDURE — 99214 OFFICE O/P EST MOD 30 MIN: CPT | Performed by: UROLOGY

## 2021-08-25 NOTE — PROGRESS NOTES
Assessment/Plan:    Other male erectile dysfunction  Sildenafil still works well for him as needed  BPH with obstruction/lower urinary tract symptoms  Nocturia is a nuisance but the patient is content with the status quo  Reassess in 1 year  Diagnoses and all orders for this visit:    Special screening for malignant neoplasm of prostate  -     PSA, Total Screen; Future    BPH with obstruction/lower urinary tract symptoms    Other male erectile dysfunction          Subjective:      Patient ID: Danilo Elizabeth is a 73 y o  male  HPI  BPH:  He notes nocturia x 2  He denies other significant urinary symptoms  He denies gross hematuria, urinary tract infections or incontinence  He is taking neither medications nor supplements for his symptoms  PSA:  [  0   Lab Value Date/Time    PSA 1 7 05/07/2021 Ani Foster   ]    AUA SYMPTOM SCORE      Most Recent Value   AUA SYMPTOM SCORE   How often have you had a sensation of not emptying your bladder completely after you finished urinating? 0   How often have you had to urinate again less than two hours after you finished urinating? 1   How often have you found you stopped and started again several times when you urinate?  0   How often have you found it difficult to postpone urination? 0   How often have you had a weak urinary stream?  1   How often have you had to push or strain to begin urination? 0   How many times did you most typically get up to urinate from the time you went to bed at night until the time you got up in the morning? 2   Quality of Life: If you were to spend the rest of your life with your urinary condition just the way it is now, how would you feel about that?  1   AUA SYMPTOM SCORE  4        Erectile Dysfunction:  Using sildenafil 80 mg successfully  Pt and wife have agreed to  "now and then" sex          The following portions of the patient's history were reviewed and updated as appropriate: allergies, current medications, past family history, past medical history, past social history, past surgical history and problem list     Review of Systems   Constitutional: Negative for activity change and fatigue  Respiratory: Negative for shortness of breath and wheezing  Cardiovascular: Negative for chest pain  Gastrointestinal: Negative for abdominal pain  Genitourinary: Negative for difficulty urinating, dysuria, frequency, hematuria and urgency  Musculoskeletal: Negative for back pain and gait problem  Skin: Negative  Allergic/Immunologic: Negative  Neurological: Negative  Psychiatric/Behavioral: Negative  Objective:      /78   Ht 5' 9" (1 753 m)   Wt 78 kg (172 lb)   BMI 25 40 kg/m²          Physical Exam  Constitutional:       Appearance: He is well-developed  HENT:      Head: Normocephalic and atraumatic  Pulmonary:      Effort: Pulmonary effort is normal    Genitourinary:     Rectum: Normal       Comments: The prostate is 40 grams, firm, smooth and non-tender  Musculoskeletal:         General: Normal range of motion  Cervical back: Normal range of motion and neck supple  Skin:     General: Skin is warm and dry  Neurological:      Mental Status: He is alert and oriented to person, place, and time  Psychiatric:         Behavior: Behavior normal          Thought Content:  Thought content normal          Judgment: Judgment normal

## 2021-09-23 ENCOUNTER — APPOINTMENT (OUTPATIENT)
Dept: URGENT CARE | Facility: CLINIC | Age: 66
End: 2021-09-23

## 2021-09-23 DIAGNOSIS — Z23 NEEDS FLU SHOT: Primary | ICD-10-CM

## 2022-09-13 ENCOUNTER — APPOINTMENT (OUTPATIENT)
Dept: LAB | Age: 67
End: 2022-09-13
Payer: COMMERCIAL

## 2022-09-13 DIAGNOSIS — Z12.5 SPECIAL SCREENING FOR MALIGNANT NEOPLASM OF PROSTATE: ICD-10-CM

## 2022-09-13 LAB — PSA SERPL-MCNC: 1.6 NG/ML (ref 0–4)

## 2022-09-13 PROCEDURE — 36415 COLL VENOUS BLD VENIPUNCTURE: CPT

## 2022-09-13 PROCEDURE — G0103 PSA SCREENING: HCPCS

## 2022-09-30 ENCOUNTER — OFFICE VISIT (OUTPATIENT)
Dept: UROLOGY | Facility: MEDICAL CENTER | Age: 67
End: 2022-09-30
Payer: COMMERCIAL

## 2022-09-30 VITALS
WEIGHT: 176 LBS | SYSTOLIC BLOOD PRESSURE: 120 MMHG | HEIGHT: 69 IN | BODY MASS INDEX: 26.07 KG/M2 | DIASTOLIC BLOOD PRESSURE: 80 MMHG | HEART RATE: 67 BPM

## 2022-09-30 DIAGNOSIS — Z12.5 PROSTATE CANCER SCREENING: Primary | ICD-10-CM

## 2022-09-30 PROCEDURE — 99213 OFFICE O/P EST LOW 20 MIN: CPT | Performed by: NURSE PRACTITIONER

## 2022-09-30 NOTE — PROGRESS NOTES
9/30/2022    Assessment and Plan    78 y o  male managed by our office    1  Benign prostatic hyperplasia  · Asymptomatic  · Will continue to monitor for worsening/progression of lower urinary tract symptoms    2  Erectile dysfunction  · Continue sildenafil    3  Prostate cancer screening  · PSA performed 09/13/2022 resulted 1 6  · AUGUSTA-AUGUSTA-prostate 40-45 g with no nodules  Smooth, symmetrical   Nontender  · Repeat PSA / AUGUSTA in 1 week    4  Family history prostate cancer  · Father    History of Present Illness  Nicki Yin is a 78 y o  male here for follow up evaluation of lower urinary tract symptoms secondary benign prostatic hyperplasia  He currently denies all lower urinary tract symptoms with sensation of complete bladder emptying with urination  He reports a full strong urinary stream   He denies any changes to his lower urinary tract symptoms since his last office evaluation  He continues to to use sildenafil as needed  He reports good results when he uses it  Patient reports a family history of prostate cancer in his father  He denies changes to his general health since his last office evaluation  Component       PSA, Total   Latest Ref Rng & Units       0 0 - 4 0 ng/mL   5/7/2021      9:33 AM 1 7   9/13/2022      10:27 AM 1 6       Review of Systems   Constitutional: Negative for chills and fever  Respiratory: Negative for cough and shortness of breath  Cardiovascular: Negative for chest pain  Gastrointestinal: Negative for abdominal distention, abdominal pain, blood in stool, nausea and vomiting  Genitourinary: Negative for difficulty urinating, dysuria, enuresis, flank pain, frequency, hematuria and urgency  Skin: Negative for rash  AUA SYMPTOM SCORE    Flowsheet Row Most Recent Value   AUA SYMPTOM SCORE    How often have you had a sensation of not emptying your bladder completely after you finished urinating?  0   How often have you had to urinate again less than two hours after you finished urinating? 0   How often have you found you stopped and started again several times when you urinate? 0   How often have you found it difficult to postpone urination? 0   How often have you had a weak urinary stream? 1   How often have you had to push or strain to begin urination?  0   How many times did you most typically get up to urinate from the time you went to bed at night until the time you got up in the morning? 0   Quality of Life: If you were to spend the rest of your life with your urinary condition just the way it is now, how would you feel about that? 1   AUA SYMPTOM SCORE 1             Past Medical History  Past Medical History:   Diagnosis Date    BPH with obstruction/lower urinary tract symptoms     Combined arterial insufficiency and corporo-venous occlusive erectile dysfunction     Microhematuria     Rheumatoid arthritis ([facility] [address] )     Weak urinary stream        Past Social History  Past Surgical History:   Procedure Laterality Date    ELBOW SURGERY      HERNIA REPAIR  1981    KNEE SURGERY       Social History     Tobacco Use   Smoking Status Never Smoker   Smokeless Tobacco Never Used       Past Family History  Family History   Problem Relation Age of Onset    Hypertension Family        Past Social history  Social History     Socioeconomic History    Marital status: /Civil Union     Spouse name: Not on file    Number of children: Not on file    Years of education: Not on file    Highest education level: Not on file   Occupational History    Occupation:    Tobacco Use    Smoking status: Never Smoker    Smokeless tobacco: Never Used   Substance and Sexual Activity    Alcohol use: No    Drug use: No    Sexual activity: Not on file   Other Topics Concern    Not on file   Social History Narrative    Not on file     Social Determinants of Health     Financial Resource Strain: Not on file   Food Insecurity: Not on file   Transportation Needs: Not on file   Physical Activity: Not on file   Stress: Not on file   Social Connections: Not on file   Intimate Partner Violence: Not on file   Housing Stability: Not on file       Current Medications  Current Outpatient Medications   Medication Sig Dispense Refill    Biotin 10 MG CAPS Take by mouth      Cholecalciferol (VITAMIN D) 2000 units CAPS Take by mouth daily      Multiple Vitamins-Minerals (CENTRUM SILVER) tablet Take by mouth daily      Omega-3 Fatty Acids (FISH OIL OMEGA-3) 1000 MG CAPS Take 2 g by mouth daily      sildenafil (REVATIO) 20 mg tablet Take 3 tablets (60 mg total) by mouth as needed (ED) 1 hour prior to sexual activity  90 tablet 1    Omega-3 Fatty Acids (FISH OIL) 645 MG CAPS Take by mouth daily      traMADol (ULTRAM) 50 mg tablet Take by mouth daily (Patient not taking: Reported on 9/30/2022)       No current facility-administered medications for this visit  Allergies  Allergies   Allergen Reactions    Oxycodone-Acetaminophen      Other reaction(s): spacey   feeling         The following portions of the patient's history were reviewed and updated as appropriate: allergies, current medications, past medical history, past social history, past surgical history and problem list       Vitals  Vitals:    09/30/22 0818   BP: 120/80   Pulse: 67   Weight: 79 8 kg (176 lb)   Height: 5' 9" (1 753 m)           Physical Exam  Physical Exam  Vitals reviewed  Constitutional:       General: He is not in acute distress  Appearance: Normal appearance  He is normal weight  HENT:      Head: Normocephalic  Cardiovascular:      Rate and Rhythm: Normal rate  Pulmonary:      Effort: No respiratory distress  Breath sounds: Normal breath sounds  Genitourinary:     Comments: AUGUSTA-prostate 40-45 g with no nodules  Skin:     General: Skin is warm and dry  Neurological:      General: No focal deficit present  Mental Status: He is alert and oriented to person, place, and time  Psychiatric:         Mood and Affect: Mood normal          Behavior: Behavior normal            Results  No results found for this or any previous visit (from the past 1 hour(s)) ]  Lab Results   Component Value Date    PSA 1 6 09/13/2022    PSA 1 7 05/07/2021     No results found for: GLUCOSE, CALCIUM, NA, K, CO2, CL, BUN, CREATININE  No results found for: WBC, HGB, HCT, MCV, PLT        Orders  Orders Placed This Encounter   Procedures    PSA, Total Screen     This is a patient instruction: This test is non-fasting  Please drink two glasses of water morning of bloodwork          Standing Status:   Future     Standing Expiration Date:   3/30/2024       COLTEN Mackenzie

## 2023-10-12 ENCOUNTER — TELEPHONE (OUTPATIENT)
Age: 68
End: 2023-10-12

## 2023-10-12 DIAGNOSIS — Z12.5 PROSTATE CANCER SCREENING: Primary | ICD-10-CM

## 2023-10-24 ENCOUNTER — LAB (OUTPATIENT)
Dept: LAB | Age: 68
End: 2023-10-24
Payer: COMMERCIAL

## 2023-10-24 DIAGNOSIS — Z12.5 PROSTATE CANCER SCREENING: ICD-10-CM

## 2023-10-24 LAB — PSA SERPL-MCNC: 1.26 NG/ML (ref 0–4)

## 2023-10-24 PROCEDURE — 36415 COLL VENOUS BLD VENIPUNCTURE: CPT

## 2023-10-24 PROCEDURE — 84153 ASSAY OF PSA TOTAL: CPT

## 2023-11-17 ENCOUNTER — OFFICE VISIT (OUTPATIENT)
Dept: UROLOGY | Facility: MEDICAL CENTER | Age: 68
End: 2023-11-17
Payer: COMMERCIAL

## 2023-11-17 VITALS
HEART RATE: 78 BPM | DIASTOLIC BLOOD PRESSURE: 70 MMHG | WEIGHT: 175 LBS | SYSTOLIC BLOOD PRESSURE: 120 MMHG | BODY MASS INDEX: 25.92 KG/M2 | HEIGHT: 69 IN

## 2023-11-17 DIAGNOSIS — N52.8 OTHER MALE ERECTILE DYSFUNCTION: ICD-10-CM

## 2023-11-17 DIAGNOSIS — N40.1 BENIGN PROSTATIC HYPERPLASIA WITH URINARY OBSTRUCTION: Primary | ICD-10-CM

## 2023-11-17 DIAGNOSIS — N13.8 BENIGN PROSTATIC HYPERPLASIA WITH URINARY OBSTRUCTION: Primary | ICD-10-CM

## 2023-11-17 LAB
SL AMB  POCT GLUCOSE, UA: ABNORMAL
SL AMB LEUKOCYTE ESTERASE,UA: ABNORMAL
SL AMB POCT BILIRUBIN,UA: ABNORMAL
SL AMB POCT BLOOD,UA: ABNORMAL
SL AMB POCT CLARITY,UA: CLEAR
SL AMB POCT COLOR,UA: YELLOW
SL AMB POCT KETONES,UA: ABNORMAL
SL AMB POCT NITRITE,UA: ABNORMAL
SL AMB POCT PH,UA: 5.5
SL AMB POCT SPECIFIC GRAVITY,UA: 1.02
SL AMB POCT URINE PROTEIN: ABNORMAL
SL AMB POCT UROBILINOGEN: 0.2

## 2023-11-17 PROCEDURE — 81003 URINALYSIS AUTO W/O SCOPE: CPT | Performed by: UROLOGY

## 2023-11-17 PROCEDURE — 99214 OFFICE O/P EST MOD 30 MIN: CPT | Performed by: UROLOGY

## 2023-11-17 NOTE — ASSESSMENT & PLAN NOTE
He has not been sexually active lately. He will call should he wish a refill of sildenafil. Will return in 1 year.

## 2023-11-17 NOTE — PROGRESS NOTES
Assessment/Plan:    Benign prostatic hyperplasia with urinary obstruction  AUA symptom score is 2. He is pleased with his voiding pattern. Urinalysis is negative. PSA was stable at 1.26 on October 23, 2023. Options were discussed and we will continue to follow with watchful waiting. He will return in 1 year and we will plan to recheck a urinalysis and PSA prior to that visit. Other male erectile dysfunction  He has not been sexually active lately. He will call should he wish a refill of sildenafil. Will return in 1 year. Diagnoses and all orders for this visit:    Benign prostatic hyperplasia with urinary obstruction  -     POCT urine dip auto non-scope  -     PSA Total, Diagnostic; Future  -     Urinalysis with microscopic; Future    Other male erectile dysfunction    Other orders  -     Cancel: POCT urine dip auto non-scope  -     ciclopirox (LOPROX) 0.77 % cream; APPLY TO AFFECTED AREA S) ON THE GROIN AREAS DAILY AT BEDTIME FOR 2 WEEKS  -     betamethasone valerate (VALISONE) 0.1 % cream; APPLY A PEA SIZED AMOUNT TO THE AFFECTED AREA DAILY          Subjective:      Patient ID: Eli Huddleston is a 80 y.o. male. Benign Prostatic Hypertrophy  This is a chronic problem. The current episode started more than 1 year ago. The problem is unchanged. Irritative symptoms do not include frequency, nocturia or urgency. Obstructive symptoms do not include dribbling, incomplete emptying, an intermittent stream, a slower stream, straining or a weak stream. Pertinent negatives include no chills, dysuria, genital pain, hematuria or hesitancy. AUA score is 0-7. He is not sexually active. Nothing aggravates the symptoms. Past treatments include nothing. Erectile Dysfunction  This is a chronic problem. The current episode started more than 1 year ago. The problem is unchanged. The nature of his difficulty is achieving erection. He reports no anxiety, decreased libido or performance anxiety.  Irritative symptoms do not include frequency, nocturia or urgency. Obstructive symptoms do not include dribbling, incomplete emptying, an intermittent stream, a slower stream, straining or a weak stream. Pertinent negatives include no chills, dysuria, genital pain, hematuria or hesitancy. Nothing aggravates the symptoms. Past treatments include sildenafil. The treatment provided moderate relief. He has been using treatment for 1 to 2 years. He has had headaches caused by medications. Risk factors include BPH. He has not been sexually active lately. The following portions of the patient's history were reviewed and updated as appropriate: allergies, current medications, past family history, past medical history, past social history, past surgical history and problem list.    Review of Systems   Constitutional: Negative. Negative for chills, diaphoresis, fatigue and fever. HENT: Negative. Eyes: Negative. Respiratory: Negative. Cardiovascular: Negative. Gastrointestinal: Negative. Endocrine: Negative. Genitourinary:  Negative for decreased libido, dysuria, frequency, hematuria, hesitancy, incomplete emptying, nocturia and urgency. See HPI   Musculoskeletal: Negative. Skin: Negative. Allergic/Immunologic: Negative. Neurological: Negative. Hematological: Negative. Psychiatric/Behavioral: Negative. The patient is not nervous/anxious.         AUA SYMPTOM SCORE      Flowsheet Row Most Recent Value   AUA SYMPTOM SCORE    How often have you had a sensation of not emptying your bladder completely after you finished urinating? 0 (P)     How often have you had to urinate again less than two hours after you finished urinating? 0 (P)     How often have you found you stopped and started again several times when you urinate? 0 (P)     How often have you found it difficult to postpone urination? 0 (P)     How often have you had a weak urinary stream? 1 (P)     How often have you had to push or strain to begin urination? 0 (P)     How many times did you most typically get up to urinate from the time you went to bed at night until the time you got up in the morning? 1 (P)     Quality of Life: If you were to spend the rest of your life with your urinary condition just the way it is now, how would you feel about that? 1 (P)     AUA SYMPTOM SCORE 2 (P)              Objective:      /70   Pulse 78   Ht 5' 9" (1.753 m)   Wt 79.4 kg (175 lb)   BMI 25.84 kg/m²          Physical Exam  Vitals reviewed. Constitutional:       General: He is not in acute distress. Appearance: Normal appearance. He is well-developed and normal weight. He is not ill-appearing, toxic-appearing or diaphoretic. HENT:      Head: Normocephalic and atraumatic. Eyes:      General: No scleral icterus. Conjunctiva/sclera: Conjunctivae normal.   Cardiovascular:      Rate and Rhythm: Normal rate. Pulmonary:      Effort: Pulmonary effort is normal.   Abdominal:      General: Bowel sounds are normal. There is no distension. Palpations: Abdomen is soft. There is no mass. Tenderness: There is no abdominal tenderness. There is no right CVA tenderness, left CVA tenderness, guarding or rebound. Hernia: No hernia is present. Genitourinary:     Penis: Normal. No phimosis or hypospadias. Testes: Normal.         Right: Mass not present. Left: Mass not present. Rectum: Normal.      Comments: Prostate 1.5 X enlarged and palpably benign. The prostate is smooth, symmetric and free of any induration. Musculoskeletal:      Cervical back: Neck supple. Skin:     General: Skin is warm and dry. Neurological:      General: No focal deficit present. Mental Status: He is alert and oriented to person, place, and time. Psychiatric:         Mood and Affect: Mood normal.         Behavior: Behavior normal.         Thought Content:  Thought content normal.         Judgment: Judgment normal.

## 2023-11-17 NOTE — ASSESSMENT & PLAN NOTE
AUA symptom score is 2. He is pleased with his voiding pattern. Urinalysis is negative. PSA was stable at 1.26 on October 23, 2023. Options were discussed and we will continue to follow with watchful waiting. He will return in 1 year and we will plan to recheck a urinalysis and PSA prior to that visit.

## 2024-12-09 ENCOUNTER — TELEPHONE (OUTPATIENT)
Age: 69
End: 2024-12-09

## 2024-12-09 DIAGNOSIS — N40.1 BENIGN PROSTATIC HYPERPLASIA WITH URINARY OBSTRUCTION: Primary | ICD-10-CM

## 2024-12-09 DIAGNOSIS — N13.8 BENIGN PROSTATIC HYPERPLASIA WITH URINARY OBSTRUCTION: Primary | ICD-10-CM

## 2024-12-09 DIAGNOSIS — Z12.5 PROSTATE CANCER SCREENING: ICD-10-CM

## 2024-12-09 NOTE — TELEPHONE ENCOUNTER
Patient needs a PSA order for upcoming visit. Please call patient once this is placed.       CB: [phone]

## 2024-12-12 ENCOUNTER — APPOINTMENT (OUTPATIENT)
Dept: LAB | Age: 69
End: 2024-12-12
Payer: COMMERCIAL

## 2024-12-12 DIAGNOSIS — N13.8 BENIGN PROSTATIC HYPERPLASIA WITH URINARY OBSTRUCTION: ICD-10-CM

## 2024-12-12 DIAGNOSIS — Z12.5 PROSTATE CANCER SCREENING: ICD-10-CM

## 2024-12-12 DIAGNOSIS — N40.1 BENIGN PROSTATIC HYPERPLASIA WITH URINARY OBSTRUCTION: ICD-10-CM

## 2024-12-12 LAB — PSA SERPL-MCNC: 1.22 NG/ML (ref 0–4)

## 2024-12-12 PROCEDURE — 84153 ASSAY OF PSA TOTAL: CPT

## 2024-12-12 PROCEDURE — 36415 COLL VENOUS BLD VENIPUNCTURE: CPT

## 2025-01-24 ENCOUNTER — OFFICE VISIT (OUTPATIENT)
Dept: UROLOGY | Facility: MEDICAL CENTER | Age: 70
End: 2025-01-24
Payer: COMMERCIAL

## 2025-01-24 VITALS
SYSTOLIC BLOOD PRESSURE: 110 MMHG | WEIGHT: 173 LBS | BODY MASS INDEX: 26.22 KG/M2 | OXYGEN SATURATION: 95 % | HEART RATE: 80 BPM | HEIGHT: 68 IN | DIASTOLIC BLOOD PRESSURE: 80 MMHG

## 2025-01-24 DIAGNOSIS — Z12.5 PROSTATE CANCER SCREENING: Primary | ICD-10-CM

## 2025-01-24 DIAGNOSIS — N40.1 BENIGN PROSTATIC HYPERPLASIA WITH URINARY OBSTRUCTION: ICD-10-CM

## 2025-01-24 DIAGNOSIS — N13.8 BENIGN PROSTATIC HYPERPLASIA WITH URINARY OBSTRUCTION: ICD-10-CM

## 2025-01-24 PROCEDURE — 99213 OFFICE O/P EST LOW 20 MIN: CPT

## 2025-01-24 NOTE — PROGRESS NOTES
1/24/2025      Assessment and Plan    70 y.o. male managed by Dr. Jc    Benign prostatic hyperplasia with urinary obstruction  AUA symptom score 2  Patient currently content with his voiding pattern off of pharmacotherapy  We will continue to monitor for worsening/progression of lower urinary tract symptoms    Prostate cancer screening  Patient's last PSA was performed 12/12/2024 and found to be stable and low at a value of 1.224.  Refer to PSA trend below.  AUGUSTA performed at today's office visit.  Palpably benign prostate.  Refer to physical exam findings.  Patient will repeat his PSA in 1 year from his last with follow-up in the office at that time to undergo AUGUSTA.    Lab Results   Component Value Date    PSA 1.224 12/12/2024    PSA 1.26 10/24/2023    PSA 1.6 09/13/2022            History of Present Illness  Josep Bragg is a 70 y.o. male here for evaluation of BPH with obstruction/lower urinary tract symptoms, erectile dysfunction, and prostate cancer screening.  Patient was last seen in the office in 11/17/2023.  Patient is not currently on any pharmacotherapy for treatment of his lower urinary tract symptoms.  Patient's erectile dysfunction is currently controlled on sildenafil 20 mg as needed 1 hour prior to sexual activity.  Patient's PSA was last performed 12/12/2024 and found to be stable and low at a value of 1.224.  AUA symptom score 2.  Today, the patient offers no new lower urinary tract complaints.  Patient notes that him and his wife are currently not sexually active and is not utilizing the sildenafil.        Review of Systems   Constitutional:  Negative for chills and fever.   HENT:  Negative for ear pain and sore throat.    Eyes:  Negative for pain and visual disturbance.   Respiratory:  Negative for cough and shortness of breath.    Cardiovascular:  Negative for chest pain and palpitations.   Gastrointestinal:  Negative for abdominal pain and vomiting.   Genitourinary:  Negative for  "decreased urine volume, difficulty urinating, dysuria, flank pain, frequency, hematuria and urgency.   Musculoskeletal:  Negative for arthralgias and back pain.   Skin:  Negative for color change and rash.   Neurological:  Negative for seizures and syncope.   All other systems reviewed and are negative.          AUA SYMPTOM SCORE      Flowsheet Row Most Recent Value   AUA SYMPTOM SCORE    How often have you had a sensation of not emptying your bladder completely after you finished urinating? 0 (P)     How often have you had to urinate again less than two hours after you finished urinating? 0 (P)     How often have you found you stopped and started again several times when you urinate? 0 (P)     How often have you found it difficult to postpone urination? 0 (P)     How often have you had a weak urinary stream? 1 (P)     How often have you had to push or strain to begin urination? 0 (P)     How many times did you most typically get up to urinate from the time you went to bed at night until the time you got up in the morning? 1 (P)     Quality of Life: If you were to spend the rest of your life with your urinary condition just the way it is now, how would you feel about that? 1 (P)     AUA SYMPTOM SCORE 2 (P)               Vitals  Vitals:    01/24/25 0800   BP: 110/80   BP Location: Left arm   Patient Position: Sitting   Cuff Size: Standard   Pulse: 80   SpO2: 95%   Weight: 78.5 kg (173 lb)   Height: 5' 8\" (1.727 m)       Physical Exam  Vitals reviewed.   Constitutional:       General: He is not in acute distress.     Appearance: Normal appearance. He is not ill-appearing.   HENT:      Head: Normocephalic and atraumatic.      Nose: Nose normal.   Eyes:      General: No scleral icterus.  Pulmonary:      Effort: No respiratory distress.   Abdominal:      General: Abdomen is flat. There is no distension.      Palpations: Abdomen is soft.      Tenderness: There is no abdominal tenderness.   Genitourinary:     Comments: " Normal phallus, testes descended bilaterally and smooth without nodularity.  AUGUSTA performed today.  Prostate estimated to be about 35 g and smooth bilaterally without nodularity or induration.  Musculoskeletal:         General: Normal range of motion.      Cervical back: Normal range of motion.   Skin:     General: Skin is warm.      Coloration: Skin is not jaundiced.   Neurological:      Mental Status: He is alert and oriented to person, place, and time.      Gait: Gait normal.   Psychiatric:         Mood and Affect: Mood normal.         Behavior: Behavior normal.           Past History  Past Medical History:   Diagnosis Date    BPH with obstruction/lower urinary tract symptoms     Combined arterial insufficiency and corporo-venous occlusive erectile dysfunction     Microhematuria     Rheumatoid arthritis (HCC)     Weak urinary stream      Social History     Socioeconomic History    Marital status: /Civil Union     Spouse name: None    Number of children: None    Years of education: None    Highest education level: None   Occupational History    Occupation:    Tobacco Use    Smoking status: Never    Smokeless tobacco: Never   Substance and Sexual Activity    Alcohol use: No    Drug use: No    Sexual activity: None   Other Topics Concern    None   Social History Narrative    None     Social Drivers of Health     Financial Resource Strain: Low Risk  (9/11/2023)    [facility]    Overall Financial Resource Strain (CARDIA)     Difficulty of Paying Living Expenses: Not hard at all   Food Insecurity: No Food Insecurity (9/11/2023)    [facility]    Hunger Vital Sign     Worried About Running Out of Food in the Last Year: Never true     Ran Out of Food in the Last Year: Never true   Transportation Needs: No Transportation Needs (9/11/2023)    Received from [facility],  [facility]    PRAPARE - Transportation     Lack of Transportation (Medical): No     Lack of Transportation (Non-Medical): No   Physical Activity: Sufficiently Active (9/11/2023)    Received from [facility]    Exercise Vital Sign     Days of Exercise per Week: 6 days     Minutes of Exercise per Session: 30 min   Stress: No Stress Concern Present (9/11/2023)    [facility]    [employer] - Occupational Stress Questionnaire     Feeling of Stress : Only a little   Social Connections: Socially Integrated (9/11/2023)    Received from [facility], [facility]    Social Connection and Isolation Panel [NHANES]     Frequency of Communication with Friends and Family: More than three times a week     Frequency of Social Gatherings with Friends and Family: More than three times a week     Attends Jain Services: More than 4 times per year     Active Member of Clubs or Organizations: Yes     Attends Club or Organization Meetings: More than 4 times per year     Marital Status:    Intimate Partner Violence: Not At Risk (9/11/2023)    [facility]    Humiliation, Afraid, Rape, and Kick questionnaire     Fear of Current or Ex-Partner: No     Emotionally Abused: No     Physically Abused: No     Sexually Abused: No   Housing Stability: Low Risk  (9/11/2023)    Received from [facility], [facility]    Housing Stability Vital Sign     Unable to Pay for Housing in the Last Year: No     Number of Places Lived in the Last Year: 1     Unstable Housing in the Last Year: No     Social History     Tobacco Use   Smoking Status Never   Smokeless Tobacco Never     Family History   Problem Relation Age of Onset    Hypertension Family        The following portions of the patient's history  "were reviewed and updated as appropriate: allergies, current medications, past medical history, past social history, past surgical history and problem list.    Results  No results found for this or any previous visit (from the past hour).]  Lab Results   Component Value Date    PSA 1.224 12/12/2024    PSA 1.26 10/24/2023    PSA 1.6 09/13/2022    PSA 1.7 05/07/2021     Lab Results   Component Value Date    CALCIUM 9.1 06/20/2024    K 4.2 06/20/2024    CO2 29 06/20/2024     06/20/2024    BUN 35 (H) 06/20/2024    CREATININE 1.06 06/20/2024     No results found for: \"WBC\", \"HGB\", \"HCT\", \"MCV\", \"PLT\"   "

## 2025-01-24 NOTE — ASSESSMENT & PLAN NOTE
Patient's last PSA was performed 12/12/2024 and found to be stable and low at a value of 1.224.  Refer to PSA trend below.  AUGUSTA performed at today's office visit.  Palpably benign prostate.  Refer to physical exam findings.  Patient will repeat his PSA in 1 year from his last with follow-up in the office at that time to undergo AUGUSTA.    Lab Results   Component Value Date    PSA 1.224 12/12/2024    PSA 1.26 10/24/2023    PSA 1.6 09/13/2022

## 2025-01-24 NOTE — ASSESSMENT & PLAN NOTE
AUA symptom score 2  Patient currently content with his voiding pattern off of pharmacotherapy  We will continue to monitor for worsening/progression of lower urinary tract symptoms